# Patient Record
Sex: FEMALE | Race: WHITE | Employment: FULL TIME | URBAN - METROPOLITAN AREA
[De-identification: names, ages, dates, MRNs, and addresses within clinical notes are randomized per-mention and may not be internally consistent; named-entity substitution may affect disease eponyms.]

---

## 2022-06-01 ENCOUNTER — APPOINTMENT (OUTPATIENT)
Dept: MAMMOGRAPHY | Age: 37
End: 2022-06-01
Payer: COMMERCIAL

## 2022-06-01 ENCOUNTER — HOSPITAL ENCOUNTER (OUTPATIENT)
Dept: MAMMOGRAPHY | Age: 37
Discharge: HOME | End: 2022-06-04
Payer: COMMERCIAL

## 2022-06-01 DIAGNOSIS — R92.8 ABNORMAL FINDING ON MAMMOGRAPHY: ICD-10-CM

## 2022-06-01 DIAGNOSIS — Z80.3 FAMILY HISTORY OF BREAST CANCER: ICD-10-CM

## 2022-06-01 DIAGNOSIS — M81.0 OSTEOPOROSIS, UNSPECIFIED OSTEOPOROSIS TYPE, UNSPECIFIED PATHOLOGICAL FRACTURE PRESENCE: ICD-10-CM

## 2022-06-01 DIAGNOSIS — F50.00 ANOREXIA NERVOSA IN REMISSION: ICD-10-CM

## 2022-06-01 PROCEDURE — 77080 DXA BONE DENSITY AXIAL: CPT

## 2022-06-01 PROCEDURE — 77066 DX MAMMO INCL CAD BI: CPT

## 2022-06-01 PROCEDURE — 76642 ULTRASOUND BREAST LIMITED: CPT

## 2022-08-24 ENCOUNTER — OFFICE VISIT (OUTPATIENT)
Dept: ENDOCRINOLOGY | Age: 37
End: 2022-08-24
Payer: COMMERCIAL

## 2022-08-24 VITALS
DIASTOLIC BLOOD PRESSURE: 64 MMHG | HEART RATE: 63 BPM | HEIGHT: 67 IN | BODY MASS INDEX: 17.89 KG/M2 | OXYGEN SATURATION: 98 % | WEIGHT: 114 LBS | SYSTOLIC BLOOD PRESSURE: 94 MMHG

## 2022-08-24 DIAGNOSIS — E04.2 MULTINODULAR GOITER: ICD-10-CM

## 2022-08-24 DIAGNOSIS — R00.2 PALPITATIONS: ICD-10-CM

## 2022-08-24 PROCEDURE — 10005 FNA BX W/US GDN 1ST LES: CPT | Performed by: INTERNAL MEDICINE

## 2022-08-24 PROCEDURE — 99204 OFFICE O/P NEW MOD 45 MIN: CPT | Performed by: INTERNAL MEDICINE

## 2022-08-24 RX ORDER — SERTRALINE HYDROCHLORIDE 100 MG/1
TABLET, FILM COATED ORAL
COMMUNITY
Start: 2022-06-08

## 2022-08-24 RX ORDER — TRIAMTERENE AND HYDROCHLOROTHIAZIDE 37.5; 25 MG/1; MG/1
TABLET ORAL
COMMUNITY
Start: 2022-05-31

## 2022-08-24 ASSESSMENT — ENCOUNTER SYMPTOMS
SHORTNESS OF BREATH: 1
CONSTIPATION: 0
DIARRHEA: 1

## 2022-08-24 NOTE — PROGRESS NOTES
Called to offer sooner apt with Dr Aguila Vasquez on 3/18/2020 at 12pm(noon)   OK per Dr Aguila Vasquez to [facility], doesn't have a meeting that day Dano Melchor MD, [facility]  [address], [address] [facility], [address] [place] [address] Av  Phone [phone]  Facsimile [phone]          Willy Sen is a 31 y.o. female seen 8/24/2022 at the request of Neisha Sanchez NP for the evaluation of thyroid nodule        ASSESSMENT AND PLAN:    1. Multinodular goiter  I performed an FNA biopsy of the inferior right lobe nodule today. The specimen will be sent to THE [facility] for cytology with [facility] genomic sequencing  if needed. Assuming the biopsy is benign, I will have her return for a follow up ultrasound in 6 months to document stability. She has no compressive symptoms at this point which would warrant referral for thyroidectomy. 2. Palpitations  She recently had thyroid function tests which were normal, and I reassured her that she is euthyroid. Her symptoms are likely anxiety related. Procedures:    Limited thyroid ultrasound 8/24/2022: In the inferior right lobe there is a predominantly solid isoechoic nodule measuring 1.14 x 1.02 x 1.68 cm, taller than wide, no obvious calcifications (TR 4). Just superiorly is a complex isoechoic nodule measuring 0.43 x 0.58 cm (TR 2). Thyroid FNA Biopsy Procedure Note:    Informed consent was obtained from the patient. I explained the small risk of bleeding and infection. A timeout was performed. The neck was cleansed using alcohol pads. The skin was anesthetized using 1% lidocaine. 5 passes with a 27-gauge needle were made into the inferior right lobe nodule using ultrasound-guidance. The needle was visualized in the nodule on all attempts. The material from 3 passes was placed in CytoLyt solution and 2 passes into the [address] [address] tube. The patient tolerated the procedure well. Post-procedure ultrasound revealed no evidence of swelling or hemorrhage. The biopsy site was covered with a bandaid.   The patient was advised to call with swelling, dysphagia, excessive bruising or severe neck pain. Follow-up and Dispositions    Return in about 6 months (around 2/24/2023). HISTORY OF PRESENT ILLNESS:    THYROID NODULE / MULTINODULAR GOITER    Presentation: Incidentally noted on CTA of the head and neck. Thyroid Cancer Risk Factors:  Her paternal grandfather had thyroid cancer. There is no history of radiation to the head/neck. Symptoms:  Denies anterior neck enlargement/pain/pressure. Denies dysphagia, positional shortness of breath, hoarseness. Imaging:  Thyroid ultrasound 8/11/2022 [facility]): Right lobe 5.5 x 1.2 x 1.6 cm, heterogeneous and nodular. In the inferior right lobe there is an ovoid solid heterogeneous isoechoic/hypoechoic lobulated nodule measuring 1.7 x 1.2 x 0.9 cm, taller than wide, contains calcifications (TR 4). Left lobe 5.4 x 1.4 x 1.3 cm, heterogeneous and nodular. In the superior left lobe there is a mixed cystic and solid, primarily solid nodule measuring 0.6 x 0.5 x 0.6 cm (TR 3). Isthmus measures 0.19 cm. Limited thyroid ultrasound 8/24/2022: In the inferior right lobe there is a predominantly solid isoechoic nodule measuring 1.14 x 1.02 x 1.68 cm, taller than wide, no obvious calcifications (TR 4). Just superiorly is a complex isoechoic nodule measuring 0.43 x 0.58 cm (TR 2). Labs:  2/2/2022: TSH 2.106, free T4 1. 16.  8/18/2022: TSH 0.974, free T4 1.26, free T3 2.7. Review of Systems   Constitutional:  Positive for fatigue. Negative for diaphoresis. Weight decreased 9 pounds the past 3 months. Respiratory:  Positive for shortness of breath. Cardiovascular:  Positive for palpitations. Gastrointestinal:  Positive for diarrhea (frequent). Negative for constipation. Endocrine: Negative for cold intolerance and heat intolerance. Genitourinary:  Negative for menstrual problem. Neurological:  Positive for tremors.    Psychiatric/Behavioral:  The patient is nervous/anxious. Vital Signs:  BP 94/64   Pulse 63   Ht 5' 6.5\" (1.689 m)   Wt 114 lb (51.7 kg)   SpO2 98%   BMI 18.12 kg/m²     Physical Exam  Constitutional:       General: She is not in acute distress. Neck:      Thyroid: No thyroid mass or thyromegaly. Cardiovascular:      Rate and Rhythm: Normal rate and regular rhythm. Lymphadenopathy:      Cervical: No cervical adenopathy. Neurological:      Motor: No tremor. Orders Placed This Encounter   Procedures    NC FINE NEEDLE ASPIRATION BX W/US GDN 1ST LESION         Current Outpatient Medications   Medication Sig Dispense Refill    sertraline (ZOLOFT) 100 MG tablet TAKE 1 TABLET BY MOUTH NIGHTLY      triamterene-hydroCHLOROthiazide (MAXZIDE-25) 37.5-25 MG per tablet TAKE 2 TABLETS BY MOUTH EVERY DAY       No current facility-administered medications for this visit.

## 2022-08-30 ENCOUNTER — TELEPHONE (OUTPATIENT)
Dept: ENDOCRINOLOGY | Age: 37
End: 2022-08-30

## 2023-03-03 ENCOUNTER — OFFICE VISIT (OUTPATIENT)
Dept: ENDOCRINOLOGY | Age: 38
End: 2023-03-03

## 2023-03-03 VITALS
OXYGEN SATURATION: 99 % | BODY MASS INDEX: 19.56 KG/M2 | SYSTOLIC BLOOD PRESSURE: 106 MMHG | WEIGHT: 123 LBS | HEART RATE: 60 BPM | DIASTOLIC BLOOD PRESSURE: 78 MMHG

## 2023-03-03 DIAGNOSIS — E04.2 MULTINODULAR GOITER: Primary | ICD-10-CM

## 2023-03-03 RX ORDER — ESCITALOPRAM OXALATE 5 MG/1
TABLET ORAL
COMMUNITY
Start: 2022-12-11

## 2023-03-03 RX ORDER — TRETINOIN 0.5 MG/G
CREAM TOPICAL
COMMUNITY
Start: 2022-12-09

## 2023-03-03 ASSESSMENT — ENCOUNTER SYMPTOMS
CONSTIPATION: 0
DIARRHEA: 0

## 2023-03-03 NOTE — PROGRESS NOTES
Dano Amin MD, [facility]od[facility]  [address], [address] [facility], [address] [place] [address]  Phone [phone]  Facsimile [phone]          Lea Harris is a 30 y.o. female seen 3/3/2023 for follow up of multinodular goiter        ASSESSMENT AND PLAN:    1. Multinodular goiter  Status post benign FNA biopsy of the inferior right lobe nodule 8/2022. Thyroid ultrasound performed today revealed that the nodule was stable in size. I will have her return in 1 year for a follow-up ultrasound to document stability. Procedures:    Thyroid ultrasound 3/3/2023: Right lobe 1.26 x 1.78 x 4.66 cm, homogeneous echotexture. In the superior right lobe anteriorly there is a complex isoechoic nodule measuring 0.41 cm (TR 2). In the inferior right lobe there is a predominantly solid isoechoic nodule measuring 1.17 x 1.13 x 1.60 cm, taller than wide, no obvious calcifications (TR 4). Just superiorly is a complex isoechoic nodule measuring 0.50 x 0.50 x 0.66 cm (TR 2). Isthmus measures 0.20 cm. Left lobe 1.45 x 1.54 x 5.17 cm, homogeneous echotexture. In the superior left lobe posteriorly there is a complex, predominantly solid isoechoic nodule measuring 0.31 x 0.40 x 0.60 cm (TR 3). In the mid left lobe anteriorly there is a complex, predominantly solid isoechoic nodule measuring 0.34 x 0.45 x 0.62 cm (TR 3). At the junction of the isthmus and left lobe there is a cyst measuring 0.22 x 0.49 x 0.70 cm (TR 1). Follow-up and Dispositions    Return in about 1 year (around 3/3/2024). HISTORY OF PRESENT ILLNESS:    THYROID NODULE / MULTINODULAR GOITER    Presentation: Incidentally noted on CTA of the head and neck. Thyroid Cancer Risk Factors:  Her paternal grandfather had thyroid cancer. There is no history of radiation to the head/neck. Symptoms:  Denies anterior neck enlargement/pain/pressure.   Denies dysphagia, positional shortness of breath, hoarseness. Imaging:  Thyroid ultrasound 8/11/2022 [facility]): Right lobe 5.5 x 1.2 x 1.6 cm, heterogeneous and nodular. In the inferior right lobe there is an ovoid solid heterogeneous isoechoic/hypoechoic lobulated nodule measuring 1.7 x 1.2 x 0.9 cm, taller than wide, contains calcifications (TR 4). Left lobe 5.4 x 1.4 x 1.3 cm, heterogeneous and nodular. In the superior left lobe there is a mixed cystic and solid, primarily solid nodule measuring 0.6 x 0.5 x 0.6 cm (TR 3). Isthmus measures 0.19 cm. Limited thyroid ultrasound 8/24/2022: In the inferior right lobe there is a predominantly solid isoechoic nodule measuring 1.14 x 1.02 x 1.68 cm, taller than wide, no obvious calcifications (TR 4). Just superiorly is a complex isoechoic nodule measuring 0.43 x 0.58 cm (TR 2). FNA biopsy inferior right lobe 8/24/2022: Benign. Thyroid ultrasound 3/3/2023: Right lobe 1.26 x 1.78 x 4.66 cm, homogeneous echotexture. In the superior right lobe anteriorly there is a complex isoechoic nodule measuring 0.41 cm (TR 2). In the inferior right lobe there is a predominantly solid isoechoic nodule measuring 1.17 x 1.13 x 1.60 cm, taller than wide, no obvious calcifications (TR 4). Just superiorly is a complex isoechoic nodule measuring 0.50 x 0.50 x 0.66 cm (TR 2). Isthmus measures 0.20 cm. Left lobe 1.45 x 1.54 x 5.17 cm, homogeneous echotexture. In the superior left lobe posteriorly there is a complex, predominantly solid isoechoic nodule measuring 0.31 x 0.40 x 0.60 cm (TR 3). In the mid left lobe anteriorly there is a complex, predominantly solid isoechoic nodule measuring 0.34 x 0.45 x 0.62 cm (TR 3). At the junction of the isthmus and left lobe there is a cyst measuring 0.22 x 0.49 x 0.70 cm (TR 1). Labs:  2/2/2022: TSH 2.106, free T4 1. 16.  8/18/2022: TSH 0.974, free T4 1.26, free T3 2.7. Review of Systems   Constitutional:  Positive for diaphoresis (night sweats) and fatigue.         Weight increased 9 pounds since last visit. Cardiovascular:  Positive for palpitations. Gastrointestinal:  Negative for constipation and diarrhea. Endocrine: Negative for cold intolerance and heat intolerance. Genitourinary:  Negative for menstrual problem. Neurological:  Positive for tremors. Psychiatric/Behavioral:  The patient is nervous/anxious. Vital Signs:  /78   Pulse 60   Wt 123 lb (55.8 kg)   SpO2 99%   BMI 19.56 kg/m²     Wt Readings from Last 3 Encounters:   03/03/23 123 lb (55.8 kg)   08/24/22 114 lb (51.7 kg)       Physical Exam  Constitutional:       General: She is not in acute distress. Neck:      Thyroid: No thyroid mass or thyromegaly. Cardiovascular:      Rate and Rhythm: Normal rate and regular rhythm. Lymphadenopathy:      Cervical: No cervical adenopathy. Neurological:      Motor: No tremor. Orders Placed This Encounter   Procedures    TSH with Reflex     Standing Status:   Future     Standing Expiration Date:   3/3/2024    CHG US SOFT TISSUE HEAD & NECK REAL TIME IMGE DOCM         Current Outpatient Medications   Medication Sig Dispense Refill    escitalopram (LEXAPRO) 5 MG tablet TAKE 1 TABLET BY MOUTH EVERY DAY FOR 90 DAYS      tretinoin (RETIN-A) 0.05 % cream APPLY 1 APPLICATION TO FACE EXTERNALLY IN THE EVENING ONCE A DAY FOR 30 DAYS      triamterene-hydroCHLOROthiazide (MAXZIDE-25) 37.5-25 MG per tablet TAKE 2 TABLETS BY MOUTH EVERY DAY       No current facility-administered medications for this visit.

## 2024-03-04 ENCOUNTER — OFFICE VISIT (OUTPATIENT)
Dept: ENDOCRINOLOGY | Age: 39
End: 2024-03-04
Payer: COMMERCIAL

## 2024-03-04 VITALS
SYSTOLIC BLOOD PRESSURE: 118 MMHG | WEIGHT: 120 LBS | DIASTOLIC BLOOD PRESSURE: 62 MMHG | OXYGEN SATURATION: 99 % | BODY MASS INDEX: 19.08 KG/M2 | HEART RATE: 69 BPM

## 2024-03-04 DIAGNOSIS — E04.2 MULTINODULAR GOITER: Primary | ICD-10-CM

## 2024-03-04 PROCEDURE — 99213 OFFICE O/P EST LOW 20 MIN: CPT | Performed by: INTERNAL MEDICINE

## 2024-03-04 PROCEDURE — 76536 US EXAM OF HEAD AND NECK: CPT | Performed by: INTERNAL MEDICINE

## 2024-03-04 ASSESSMENT — ENCOUNTER SYMPTOMS
CONSTIPATION: 0
DIARRHEA: 0

## 2024-03-04 NOTE — PROGRESS NOTES
breath, hoarseness.    Imaging:  Thyroid ultrasound 8/11/2022 ([facility]): Right lobe 5.5 x 1.2 x 1.6 cm, heterogeneous and nodular.  In the inferior right lobe there is an ovoid solid heterogeneous isoechoic/hypoechoic lobulated nodule measuring 1.7 x 1.2 x 0.9 cm, taller than wide, contains calcifications (TR 4).  Left lobe 5.4 x 1.4 x 1.3 cm, heterogeneous and nodular.  In the superior left lobe there is a mixed cystic and solid, primarily solid nodule measuring 0.6 x 0.5 x 0.6 cm (TR 3).  Isthmus measures 0.19 cm.  Limited thyroid ultrasound 8/24/2022: In the inferior right lobe there is a predominantly solid isoechoic nodule measuring 1.14 x 1.02 x 1.68 cm, taller than wide, no obvious calcifications (TR 4).  Just superiorly is a complex isoechoic nodule measuring 0.43 x 0.58 cm (TR 2).  FNA biopsy inferior right lobe 8/24/2022: Benign.  Thyroid ultrasound 3/3/2023: Right lobe 1.26 x 1.78 x 4.66 cm, homogeneous echotexture.  In the superior right lobe anteriorly there is a complex isoechoic nodule measuring 0.41 cm (TR 2).  In the inferior right lobe there is a predominantly solid isoechoic nodule measuring 1.17 x 1.13 x 1.60 cm, taller than wide, no obvious calcifications (TR 4).  Just superiorly is a complex isoechoic nodule measuring 0.50 x 0.50 x 0.66 cm (TR 2).  Isthmus measures 0.20 cm.  Left lobe 1.45 x 1.54 x 5.17 cm, homogeneous echotexture.  In the superior left lobe posteriorly there is a complex, predominantly solid isoechoic nodule measuring 0.31 x 0.40 x 0.60 cm (TR 3).  In the mid left lobe anteriorly there is a complex, predominantly solid isoechoic nodule measuring 0.34 x 0.45 x 0.62 cm (TR 3).  At the junction of the isthmus and left lobe there is a cyst measuring 0.22 x 0.49 x 0.70 cm (TR 1).  Thyroid ultrasound 3/4/2024: Right lobe 1.28 x 1.70 x 4.57 cm, homogeneous echotexture.  In the superior right anteriorly there is a complex isoechoic nodule 0.20 x 0.40 x 0.45 cm (TR 2).  In the

## 2024-07-04 ENCOUNTER — HOSPITAL ENCOUNTER (EMERGENCY)
Age: 39
Discharge: HOME | End: 2024-07-04
Payer: COMMERCIAL

## 2024-07-04 ENCOUNTER — APPOINTMENT (OUTPATIENT)
Dept: GENERAL RADIOLOGY | Age: 39
End: 2024-07-04
Payer: COMMERCIAL

## 2024-07-04 VITALS
BODY MASS INDEX: 18.96 KG/M2 | HEART RATE: 69 BPM | DIASTOLIC BLOOD PRESSURE: 80 MMHG | OXYGEN SATURATION: 100 % | WEIGHT: 118 LBS | TEMPERATURE: 98 F | RESPIRATION RATE: 17 BRPM | HEIGHT: 66 IN | SYSTOLIC BLOOD PRESSURE: 126 MMHG

## 2024-07-04 DIAGNOSIS — S97.112A CRUSHING INJURY OF LEFT GREAT TOE, INITIAL ENCOUNTER: Primary | ICD-10-CM

## 2024-07-04 PROCEDURE — 73630 X-RAY EXAM OF FOOT: CPT

## 2024-07-04 PROCEDURE — 99283 EMERGENCY DEPT VISIT LOW MDM: CPT

## 2024-07-04 ASSESSMENT — PAIN - FUNCTIONAL ASSESSMENT: PAIN_FUNCTIONAL_ASSESSMENT: 0-10

## 2024-07-04 ASSESSMENT — LIFESTYLE VARIABLES
HOW MANY STANDARD DRINKS CONTAINING ALCOHOL DO YOU HAVE ON A TYPICAL DAY: 1 OR 2
HOW OFTEN DO YOU HAVE A DRINK CONTAINING ALCOHOL: MONTHLY OR LESS

## 2024-07-04 ASSESSMENT — PAIN SCALES - GENERAL: PAINLEVEL_OUTOF10: 6

## 2024-07-04 NOTE — ED TRIAGE NOTES
PT ambulatory with limp gait into triage by self. PT reports dropping 10lb weight onto left big toe. Pt reports hearing a \"pop\" after incident. Pt reports reports injury occurred an hour pta. No pain meds pta.

## 2024-07-04 NOTE — DISCHARGE INSTRUCTIONS
Ice and elevate to help with pain/swelling. You can wear the walking boot when you are up ambulating to help with pain. I have placed a referral for you to follow up with Orthopedics.     Follow-up with your PCP in 1 to 2 days if no improvement.  Return to the ER for any new or worsening symptoms.

## 2024-07-04 NOTE — ED NOTES
I have reviewed discharge instructions with the patient.  The patient verbalized understanding.    Patient left ED via Discharge Method: ambulatory to Home with self.    Opportunity for questions and clarification provided.       Patient given 0 scripts.         To continue your aftercare when you leave the hospital, you may receive an automated call from our care team to check in on how you are doing.  This is a free service and part of our promise to provide the best care and service to meet your aftercare needs.” If you have questions, or wish to unsubscribe from this service please call [phone].  Thank you for Choosing our [facility] Emergency Department.

## 2024-07-04 NOTE — ED PROVIDER NOTES
on her toe, felt a \"pop\".  States that is very painful for her to put weight on it.  Denies any open wounds.  States it is a throbbing sensation, denies numbness.  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    The history is provided by the patient. No  was used.     Physical Exam     Vitals signs and nursing note reviewed:  Vitals:    07/04/24 1311   BP: 120/81   Pulse: 63   Resp: 17   Temp: 98.1 °F (36.7 °C)   TempSrc: Oral   SpO2: 99%   Weight: 53.5 kg (118 lb)   Height: 1.676 m (5' 6\")      Physical Exam  Vitals and nursing note reviewed.   Constitutional:       General: She is not in acute distress.     Appearance: Normal appearance.   HENT:      Head: Normocephalic and atraumatic.      Nose: Nose normal.   Eyes:      Conjunctiva/sclera: Conjunctivae normal.   Cardiovascular:      Rate and Rhythm: Normal rate and regular rhythm.      Pulses:           Dorsalis pedis pulses are 2+ on the left side.        Posterior tibial pulses are 2+ on the left side.   Pulmonary:      Effort: Pulmonary effort is normal.   Musculoskeletal:        Feet:    Feet:      Left foot:      Skin integrity: Skin integrity normal.   Skin:     General: Skin is warm and dry.      Capillary Refill: Capillary refill takes less than 2 seconds.   Neurological:      General: No focal deficit present.      Mental Status: She is alert and oriented to person, place, and time.   Psychiatric:         Mood and Affect: Mood normal.         Thought Content: Thought content normal.         Judgment: Judgment normal.        Procedures     Procedures    Orders Placed This Encounter   Procedures    XR FOOT LEFT (MIN 3 VIEWS)    [facility]    [facility] SUPPL[facility] Walker Boot, Air Select Low Top, Left; MD (M7-10/F8-11)        Medications given during this emergency department visit:  Medications - No data to display    New Prescriptions    No medications on file

## 2024-07-05 ENCOUNTER — TELEPHONE (OUTPATIENT)
Dept: ORTHOPEDIC SURGERY | Age: 39
End: 2024-07-05

## 2024-07-11 ENCOUNTER — OFFICE VISIT (OUTPATIENT)
Dept: ORTHOPEDIC SURGERY | Age: 39
End: 2024-07-11

## 2024-07-11 DIAGNOSIS — S97.112A CRUSHING INJURY OF LEFT GREAT TOE, INITIAL ENCOUNTER: Primary | ICD-10-CM

## 2024-07-11 DIAGNOSIS — S92.425A CLOSED NONDISPLACED FRACTURE OF DISTAL PHALANX OF LEFT GREAT TOE, INITIAL ENCOUNTER: ICD-10-CM

## 2024-07-11 DIAGNOSIS — S96.911A STRAIN OF RIGHT FOOT, INITIAL ENCOUNTER: ICD-10-CM

## 2024-07-11 RX ORDER — LORAZEPAM 0.5 MG/1
TABLET ORAL
COMMUNITY
Start: 2024-06-10

## 2024-07-11 NOTE — PROGRESS NOTES
Name: Jovanna Dale  YOB: 1985  Gender: female  MRN: [id number]    CC: Left big toe injury    HPI:   07/04/2024: 10 pound weight crush left great toe  07/04/2024: [facility] ED  07/11/2024: Initial visit: Left big toe injury: Altered gait lateral foot pain    ROS/Meds/PSH/PMH/FH/SH: only reviewed pertinent/relevant information today    Tobacco:  reports that she has never smoked. She has never used smokeless tobacco.     Physical Examination:  Patient appears to be alert and oriented with acceptable appearance.  No obvious distress or SOB  CV: appears to have acceptable vascular color and capillary refill  Neuro: appears to have mostly intact light touch sensation   Skin: Left great toe subungual bruising; no paronychial infection; no lateral foot swelling  MS: Standing: Plantigrade: Gait protected left  Left = great toe distal phalanx pain; no evidence of nail infection  Left = fifth metatarsal tuberosity insertional peroneal tendon pain; slight proximal 1/3 5th metatarsal shaft pain  Left = good ankle/foot motion; 5/5 strength    XR: Left: Standing AP lateral oblique foot taken on return  XR Impression:  As above      Reviewed Test/Records/Documents:   07/04/2024: [facility] ED: 10 pound weight dropped on left great toe feeling a pop: Diagnosed with crushing injury: Left great toe: Radiologist did not report a fracture, but ED PA Ms. Zazueta suspected distal phalanx fracture: Placed in 3D boot:  07/04/2024: [facility] ED: Left foot x-ray: Radiology impression: Negative left foot:  My review of ED films concurs with MARCEL Aviles: Great toe distal phalanx transverse fracture nondisplaced      Assessment:    Left great toe crush injury: Transverse distal phalanx extra-articular fracture  Left lateral foot pain felt altered gait etiology    Plan:   The patient and I discussed the above assessment. We explored treatment options.     Regarding her left great toe crush injury:  She has no evidence of nail infection  She

## 2024-07-11 NOTE — PROGRESS NOTES
Patient was fitted and instructed on a Post Op Shoe and an Aircast Air Heel Brace for the left foot. Patient read and signed documenting they understand and agree to [facility]'s current DME return policy.

## 2024-07-22 ENCOUNTER — TELEPHONE (OUTPATIENT)
Dept: ORTHOPEDIC SURGERY | Age: 39
End: 2024-07-22

## 2024-07-22 NOTE — TELEPHONE ENCOUNTER
Called and spoke to pt a 3 week follow up with  was scheduled, I will discuss pt work note with Dr Koo and call back with an update.

## 2024-07-22 NOTE — TELEPHONE ENCOUNTER
----- Message from Jovanna Dale sent at 7/22/2024  2:00 PM EDT -----  Regarding: Left foot   Contact: [phone]  Good afternoon!     I have been wearing the air cast with the boot and trying the tennis shoe as it’s getting closer to return to work but it’s not as supportive or as comfortable. How long do I need to wear the boot? 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     Also, I was told I had a fracture in the left great toe but the X-ray is negative. Was there a fracture or not? I have a return to work appointment with employee health and I know they’ll ask these questions.     Thanks so much,  Jovanna Dale

## 2024-07-23 ENCOUNTER — TELEPHONE (OUTPATIENT)
Dept: NEUROLOGY | Age: 39
End: 2024-07-23

## 2024-07-23 NOTE — TELEPHONE ENCOUNTER
----- Message from Lauren Osborne MA sent at 7/22/2024  4:38 PM EDT -----  Regarding: FW: Left foot   Contact: [phone]    ----- Message -----  From: Jovanna Dale  Sent: 7/22/2024   2:00 PM EDT  To: #  Subject: Left foot                                        Good afternoon!     I have been wearing the air cast with the boot and trying the tennis shoe as it’s getting closer to return to work but it’s not as supportive or as comfortable. How long do I need to wear the boot? 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     Also, I was told I had a fracture in the left great toe but the X-ray is negative. Was there a fracture or not? I have a return to work appointment with employee health and I know they’ll ask these questions.     Thanks so much,  Jovanna Dale

## 2024-07-23 NOTE — TELEPHONE ENCOUNTER
Called and spoke to pt , pt will print off work note off of my chart. Pt will try wearing the boot without the Aircast and see if its more comfortable for her. Pt was made an apt to see  on 08/01/2024.

## 2024-08-01 ENCOUNTER — OFFICE VISIT (OUTPATIENT)
Dept: ORTHOPEDIC SURGERY | Age: 39
End: 2024-08-01

## 2024-08-01 VITALS — BODY MASS INDEX: 18.96 KG/M2 | WEIGHT: 118 LBS | HEIGHT: 66 IN

## 2024-08-01 DIAGNOSIS — S97.112A CRUSHING INJURY OF LEFT GREAT TOE, INITIAL ENCOUNTER: Primary | ICD-10-CM

## 2024-08-01 DIAGNOSIS — S92.425A CLOSED NONDISPLACED FRACTURE OF DISTAL PHALANX OF LEFT GREAT TOE, INITIAL ENCOUNTER: ICD-10-CM

## 2024-08-01 NOTE — PROGRESS NOTES
Name: Jovanna Dale  YOB: 1985  Gender: female  MRN: [id number]    08/01/2024: Follow-up visit    HPI:   07/04/2024: 10 pound weight crush left great toe  07/04/2024: [facility] ED  07/11/2024: Initial visit: Left big toe injury: Altered gait lateral foot pain    ROS/Meds/PSH/PMH/FH/SH: only reviewed pertinent/relevant information today    Tobacco:  reports that she has never smoked. She has never used smokeless tobacco.     Physical Examination:  Patient appears to be alert and oriented with acceptable appearance.  No obvious distress or SOB  CV: appears to have acceptable vascular color and capillary refill  Neuro: appears to have mostly intact light touch sensation   Skin: Left = old great toe subungual bruising; no paronychial infection; no lateral foot swelling  MS: Standing: Plantigrade: Gait protected postop shoe  Left = minimal great toe distal phalanx pain; no evidence of nail infection  Left = no reproducible 5th metatarsal, peroneal tendon, lateral ankle or hindfoot pain  Left = good ankle/foot motion; 5/5 strength    XR: Left: Standing AP lateral oblique foot taken today with stable healing great toe distal phalanx tuft fracture; first MTP arthritic changes lateral MTH  XR Impression:  As above      Reviewed Test/Records/Documents:   07/04/2024: [facility] ED: 10 pound weight dropped on left great toe feeling a pop: Diagnosed with crushing injury: Left great toe: Radiologist did not report a fracture, but ED PA Ms. Zazueta suspected distal phalanx fracture: Placed in 3D boot:  07/04/2024: [facility] ED: Left foot x-ray: Radiology impression: Negative left foot:  My review of ED films concurs with MARCEL Aviles: Great toe distal phalanx transverse fracture nondisplaced      Assessment:    Left great toe crush injury: Transverse distal phalanx extra-articular fracture  Left lateral foot pain felt altered gait etiology    Plan:   The patient and I discussed the above assessment. We explored treatment options.